# Patient Record
Sex: MALE | Race: WHITE | URBAN - METROPOLITAN AREA
[De-identification: names, ages, dates, MRNs, and addresses within clinical notes are randomized per-mention and may not be internally consistent; named-entity substitution may affect disease eponyms.]

---

## 2023-09-13 ENCOUNTER — HOME CARE VISIT (OUTPATIENT)
Age: 83
End: 2023-09-13

## 2023-09-14 NOTE — HOSPICE
Met with patient's daughter Omari Bang to discuss hospice care for father. This RN had met earlier with the Mr. Belkys Hogue, he was pleasantly confused intermittently, and conversant at other times, answering questions appropriately. During the visit he was able to stand up independently from the recliner with the aid of his walker to 'stretch his legs' he stated. Evidence of BLE edema extending to his knees. There was a dressing to his L forearm s/p a recent fall and and extended time in the ER at [facility], 9/10/23. Reviewed the chart and discussed with Dr. Olive Turner who confirmed meeting criteria for Hospice with the diagnosis of Late Effects of CVA, (MRI 2021 demonstrates volume loss, and ischemic changes). He has comorbidities of AF, CAD, CHF, HTN that provide supporting evidence with the recent mental and physical decline and fall. However, Bri Trinidad was concerned that her father would not be able to continue PT,OT & ST if admitted to hospice care. Her father enjoys the therapies and she perceives that it provides purpose for him. Bri Trinidad stated that her father seems to have a perked up since return from the ER on Sunday and she would like to hold off on admission to Hospice. This RN understood her decision and she was made aware that re-evaluation could be arranged in the future. A Hospice Informational folder was provided. She was appreciative of the visit.